# Patient Record
Sex: MALE | Race: WHITE
[De-identification: names, ages, dates, MRNs, and addresses within clinical notes are randomized per-mention and may not be internally consistent; named-entity substitution may affect disease eponyms.]

---

## 2022-04-16 ENCOUNTER — APPOINTMENT (OUTPATIENT)
Dept: ORTHOPEDIC SURGERY | Facility: CLINIC | Age: 72
End: 2022-04-16
Payer: MEDICARE

## 2022-04-16 ENCOUNTER — APPOINTMENT (OUTPATIENT)
Dept: ORTHOPEDIC SURGERY | Facility: CLINIC | Age: 72
End: 2022-04-16

## 2022-04-16 VITALS — WEIGHT: 195 LBS | BODY MASS INDEX: 29.55 KG/M2 | HEIGHT: 68 IN

## 2022-04-16 DIAGNOSIS — I10 ESSENTIAL (PRIMARY) HYPERTENSION: ICD-10-CM

## 2022-04-16 DIAGNOSIS — Z78.9 OTHER SPECIFIED HEALTH STATUS: ICD-10-CM

## 2022-04-16 DIAGNOSIS — Z87.438 PERSONAL HISTORY OF OTHER DISEASES OF MALE GENITAL ORGANS: ICD-10-CM

## 2022-04-16 DIAGNOSIS — E78.00 PURE HYPERCHOLESTEROLEMIA, UNSPECIFIED: ICD-10-CM

## 2022-04-16 PROBLEM — Z00.00 ENCOUNTER FOR PREVENTIVE HEALTH EXAMINATION: Status: ACTIVE | Noted: 2022-04-16

## 2022-04-16 PROCEDURE — J3490M: CUSTOM

## 2022-04-16 PROCEDURE — 99204 OFFICE O/P NEW MOD 45 MIN: CPT | Mod: 25

## 2022-04-16 PROCEDURE — 99214 OFFICE O/P EST MOD 30 MIN: CPT | Mod: 25

## 2022-04-16 PROCEDURE — 73010 X-RAY EXAM OF SHOULDER BLADE: CPT | Mod: RT

## 2022-04-16 PROCEDURE — 20611 DRAIN/INJ JOINT/BURSA W/US: CPT | Mod: LT

## 2022-04-16 PROCEDURE — 73030 X-RAY EXAM OF SHOULDER: CPT | Mod: RT

## 2022-04-16 RX ORDER — DICLOFENAC POTASSIUM 50 MG/1
50 TABLET, COATED ORAL TWICE DAILY
Qty: 60 | Refills: 0 | Status: ACTIVE | COMMUNITY
Start: 2022-04-16 | End: 1900-01-01

## 2022-04-16 NOTE — PHYSICAL EXAM
[de-identified] : R [Bilateral] : shoulder bilaterally [4 ___] : forward flexion 4[unfilled]/5 [4___] : external rotation 4[unfilled]/5 [] : motor and sensory intact distally [FreeTextEntry9] : FE: R 80, L 120\par ER: R 20, L 40 [FreeTextEntry1] : mild degenerative changes

## 2022-04-16 NOTE — ASSESSMENT
[FreeTextEntry1] : R/L Rc tendonitis/impingement.\par Trial of PT.\par Diclofenac sent.\par B/l Sa injections.\par MRIs if not improved.\par \par Large Joint Injection was performed because of pain and inflammation, failure of conservative treatment.  \par Medications:\par Depo-Medrol: 1 cc, 80 mg.\par Lidocaine: 2 cc, 1%. \par Marcaine: 2 cc, .25%. \par \par Medication was injected in the bilateral subacromial space. Patient has tried OTC's including aspirin, Ibuprofen, Aleve etc or prescription NSAIDs, and/or exercises at home and/ or physical therapy without satisfactory response. The risks, benefits, and alternatives to cortisone injection were explained in full to the patient. Risks outlined include but are not limited to infection, sepsis, bleeding, scarring, skin discoloration, temporary increase in pain, syncopal episode, failure to resolve symptoms, allergic reaction, symptom recurrence, and elevation of blood sugar in diabetics. Patient understood the risks. All questions were answered. After discussion of options, patient requested an injection. Oral informed consent was obtained and sterile prep of the injection site was performed using alcohol. Sterile technique was utilized for the procedure including the preparation of the solutions used for the injection. Ethyl chloride spray was used topically.  Sterile technique used. Patient tolerated procedure well. Post Procedure Instructions: Patient was advised to call if redness, pain, or fever occur and apply ice for 15 min. out of every hour for the next 12-24 hours as tolerated. patient was advised to rest the joint(s) for 2 days.\par \par Ultrasound Guidance was used for the following reasons: for prior failure or difficult injection and to visualize tearing and inflammation.\par Ultrasound guided injection was performed of the shoulder, visualization of the needle and placement of injection was performed without complication.\par \par

## 2022-04-16 NOTE — HISTORY OF PRESENT ILLNESS
[Left Arm] : left arm [Right Arm] : right arm [10] : 10 [Sharp] : sharp [Shooting] : shooting [Constant] : constant [Full time] : Work status: full time [de-identified] : 70 y/o RHD male  with bilateral shoulder pain that started 9 days ago without injury. Pain to right > left with difficulty lifting arm.  Pain is anterior and lateral.  No prior shoulder injuries. Pain to touch on right as well. No paresthesias. Pain is causing weakness.  He tried heat, ice and chiropractor.  \par  [] : Post Surgical Visit: no [FreeTextEntry3] : 1 week ago [FreeTextEntry5] : patient is here with shoulder pain since 1 week ago\par patient states he woke up with pain in the right shoulder that goes into the left  [de-identified] : none

## 2022-05-04 ENCOUNTER — APPOINTMENT (OUTPATIENT)
Dept: MRI IMAGING | Facility: CLINIC | Age: 72
End: 2022-05-04

## 2022-05-11 ENCOUNTER — APPOINTMENT (OUTPATIENT)
Dept: ORTHOPEDIC SURGERY | Facility: CLINIC | Age: 72
End: 2022-05-11

## 2022-05-12 ENCOUNTER — FORM ENCOUNTER (OUTPATIENT)
Age: 72
End: 2022-05-12

## 2022-05-13 ENCOUNTER — APPOINTMENT (OUTPATIENT)
Dept: MRI IMAGING | Facility: CLINIC | Age: 72
End: 2022-05-13
Payer: MEDICARE

## 2022-05-13 PROCEDURE — 73221 MRI JOINT UPR EXTREM W/O DYE: CPT | Mod: RT

## 2022-05-18 ENCOUNTER — APPOINTMENT (OUTPATIENT)
Dept: ORTHOPEDIC SURGERY | Facility: CLINIC | Age: 72
End: 2022-05-18
Payer: MEDICARE

## 2022-05-18 VITALS — WEIGHT: 195 LBS | HEIGHT: 68 IN | BODY MASS INDEX: 29.55 KG/M2

## 2022-05-18 DIAGNOSIS — M25.512 PAIN IN RIGHT SHOULDER: ICD-10-CM

## 2022-05-18 DIAGNOSIS — M67.921 UNSPECIFIED DISORDER OF SYNOVIUM AND TENDON, RIGHT UPPER ARM: ICD-10-CM

## 2022-05-18 DIAGNOSIS — M25.511 PAIN IN RIGHT SHOULDER: ICD-10-CM

## 2022-05-18 PROCEDURE — 99215 OFFICE O/P EST HI 40 MIN: CPT

## 2022-05-18 RX ORDER — METHYLPREDNISOLONE 4 MG/1
4 TABLET ORAL
Qty: 1 | Refills: 0 | Status: ACTIVE | COMMUNITY
Start: 2022-05-18 | End: 1900-01-01

## 2022-05-18 RX ORDER — CYCLOBENZAPRINE HYDROCHLORIDE 10 MG/1
10 TABLET, FILM COATED ORAL
Qty: 30 | Refills: 2 | Status: ACTIVE | COMMUNITY
Start: 2022-05-18 | End: 1900-01-01

## 2022-05-18 NOTE — PHYSICAL EXAM
[Bilateral] : shoulder bilaterally [4 ___] : forward flexion 4[unfilled]/5 [4___] : external rotation 4[unfilled]/5 [] : no AC joint tenderness [FreeTextEntry9] : FE: R 80, L 120\par ER: R 20, L 40 [FreeTextEntry1] : mild degenerative changes

## 2022-05-18 NOTE — HISTORY OF PRESENT ILLNESS
[Left Arm] : left arm [Right Arm] : right arm [Gradual] : gradual [10] : 10 [Dull/Aching] : dull/aching [Radiating] : radiating [Sharp] : sharp [Constant] : constant [Household chores] : household chores [Leisure] : leisure [Sleep] : sleep [Nothing helps with pain getting better] : Nothing helps with pain getting better [Shooting] : shooting [Full time] : Work status: full time [de-identified] : 5/18/22: Here for follow up bilateral shoulder pain and MRI review. He was able to tolerate 20 minutes for R shoulder imaging. He continues to have sharp and shooting pain. He cannot use arms to get out of chair. Feels he does not have strength to push himself up. He does not sleep more than 10-15 minutes with pain. Radiating pain, n/t bilateral arms to thenar area of hands. Partial relief with csi x 2 weeks. He feels diclofenac is helpful. His wife has to help him with adl's, needs full assistance to get dressed.\par \par MRI R shoulder: \par Impression: Motion artifact.\par 1. Circumferential labral fraying, degeneration, and tear. Biceps tendinopathy with tear of the horizontal \par segment and anchor and tenosynovitis.\par 2. 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 Infraspinatus tendinopathy and fraying with 5 mm articular insertional tear and traction cysts in the humeral \par head with stress reaction and no fracture.\par 4. Supraspinatus tendinopathy and fraying with 5-mm articular tear.\par 5. Capsular thickening, which can be seen with adhesive capsulitis.\par 6. Glenohumeral arthrosis with joint effusion.\par \par 4/16/22: 72 y/o RHD male  with bilateral shoulder pain that started 9 days ago without injury. Pain to right > left with difficulty lifting arm.  Pain is anterior and lateral.  No prior shoulder injuries. Pain to touch on right as well. No paresthesias. Pain is causing weakness.  He tried heat, ice and chiropractor.  \par  [] : Post Surgical Visit: no [FreeTextEntry1] : Kun shoulders [FreeTextEntry3] : 1 week ago [FreeTextEntry5] : patient is here with shoulder pain since 1 week ago\par patient states he woke up with pain in the right shoulder that goes into the left  [FreeTextEntry7] : Kun arms [de-identified] : Pain is always at a 10 [de-identified] :  [de-identified] : XR [de-identified] : none

## 2022-05-18 NOTE — ASSESSMENT
[FreeTextEntry1] : R/L RC tendonitis/impingement. Cervical radiculitis.\par MRI R shoulder reveal PRCT, bursitis, biceps tearing\par B/L SA injections 4/16/22 partial relief x 2 weeks.\par He will try PT. \par MDP, Cyclobenzaprine qhs.\par May be related to his C-spine. Recommend seeing pain mgmt for possible BLAZE's.\par Return 6 weeks re-eval.\par \par

## 2022-05-18 NOTE — DATA REVIEWED
[MRI] : MRI [Right] : of the right [Shoulder] : shoulder [I independently reviewed and interpreted images and report] : I independently reviewed and interpreted images and report [FreeTextEntry1] : PRCT, bursitis, biceps tearing

## 2022-05-31 ENCOUNTER — APPOINTMENT (OUTPATIENT)
Dept: PAIN MANAGEMENT | Facility: CLINIC | Age: 72
End: 2022-05-31
Payer: MEDICARE

## 2022-05-31 VITALS — BODY MASS INDEX: 29.55 KG/M2 | HEIGHT: 68 IN | WEIGHT: 195 LBS

## 2022-05-31 PROCEDURE — 99214 OFFICE O/P EST MOD 30 MIN: CPT

## 2022-05-31 RX ORDER — DICLOFENAC SODIUM 50 MG/1
50 TABLET, DELAYED RELEASE ORAL TWICE DAILY
Qty: 60 | Refills: 0 | Status: ACTIVE | COMMUNITY
Start: 2022-05-31 | End: 1900-01-01

## 2022-05-31 NOTE — PHYSICAL EXAM
[de-identified] : Constitutional; Appears well, no apparent distress\par Ability to communicate: Normal \par Respiratory: non-labored breathing\par Skin: No rash noted\par Head: Normocephalic, atraumatic\par Neck: no visible thyroid enlargement\par Eyes: Extraocular movements intact\par Neurologic: Alert and oriented x3\par Psychiatric: normal mood, affect and behavior \par \par  [] : positive [place]

## 2022-05-31 NOTE — HISTORY OF PRESENT ILLNESS
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

## 2022-05-31 NOTE — DISCUSSION/SUMMARY
[de-identified] : After discussing various treatment options with the patient including but not limited to oral medications, physical therapy, exercise modalities as well as interventional spinal injections, we have decided with the following plan:\par \par - Continue Home exercises, stretching, activity modification, physical therapy, and conservative care.\par - Recommend C7-T1 Cervical Epidural Steroid Injection under fluoroscopic guidance with image.\par - The risks, benefits and alternatives of the proposed procedure were explained in detail with the patient. The risks outlined include but are not limited to infection, bleeding, post-dural puncture headache, nerve injury, a temporary increase in pain, failure to resolve symptoms, allergic reaction, symptom recurrence, and possible elevation of blood sugar in diabetics. All questions were answered to patient's apparent satisfaction and he/she verbalized an understanding.\par - Patient is presenting with acute/sub-acute radicular pain with impairment in ADLs and functionality.  The pain has not responded to conservative care including NSAID therapy and/or physical therapy.  There is no bleeding tendency, unstable medical condition, or systemic infection.\par - Follow up in 1-2 weeks post injection for re-evaluation.\par - Recommend Diclofenac 50mg BID PRN. Potential adverse effects including but not limited to bleeding, ulcers, increased risk of hypertension, heart disease, kidney disease and stroke were discussed with the patient.  Medication would allow patient to be more mobile and perform ADL's.  Will continue to monitor patient and attempt to wean as soon as possible. Will use the lowest dosage possible for the shortest possible period of time.\par

## 2022-06-21 ENCOUNTER — APPOINTMENT (OUTPATIENT)
Dept: ORTHOPEDIC SURGERY | Facility: CLINIC | Age: 72
End: 2022-06-21
Payer: MEDICARE

## 2022-06-21 VITALS — WEIGHT: 195 LBS | BODY MASS INDEX: 29.55 KG/M2 | HEIGHT: 68 IN

## 2022-06-21 PROCEDURE — 99214 OFFICE O/P EST MOD 30 MIN: CPT

## 2022-06-21 RX ORDER — CYCLOBENZAPRINE HYDROCHLORIDE 10 MG/1
10 TABLET, FILM COATED ORAL
Qty: 30 | Refills: 2 | Status: ACTIVE | COMMUNITY
Start: 2022-06-21 | End: 1900-01-01

## 2022-06-21 NOTE — ASSESSMENT
[FreeTextEntry1] : R/L RC tendonitis/impingement. Cervical radiculitis.\par MRI R shoulder reveal PRCT, bursitis, biceps tearing\par B/L SA injections 4/16/22 partial relief x 2 weeks.\par He continue PT.  \par Diclofenac, Flexeril 10 mg QHS.\par He saw pain mgmt for possible BLAZE's.\par Return 6 weeks re-eval.\par \par

## 2022-06-21 NOTE — HISTORY OF PRESENT ILLNESS
[Left Arm] : left arm [Right Arm] : right arm [Gradual] : gradual [10] : 10 [Dull/Aching] : dull/aching [Radiating] : radiating [Sharp] : sharp [Shooting] : shooting [Constant] : constant [Nothing helps with pain getting better] : Nothing helps with pain getting better [Full time] : Work status: full time [de-identified] : 6/21/22: Here for follow up.  He saw pain management who did recommend a BLAZE. \par \par 5/18/22: Here for follow up bilateral shoulder pain and MRI review. He was able to tolerate 20 minutes for R shoulder imaging. He continues to have sharp and shooting pain. He cannot use arms to get out of chair. Feels he does not have strength to push himself up. He does not sleep more than 10-15 minutes with pain. Radiating pain, n/t bilateral arms to thenar area of hands. Partial relief with csi x 2 weeks. He feels diclofenac is helpful. His wife has to help him with adl's, needs full assistance to get dressed.\par \par MRI R shoulder: \par Impression: Motion artifact.\par 1. Circumferential labral fraying, degeneration, and tear. Biceps tendinopathy with tear of the horizontal \par segment and anchor and tenosynovitis.\par 2. 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 Infraspinatus tendinopathy and fraying with 5 mm articular insertional tear and traction cysts in the humeral \par head with stress reaction and no fracture.\par 4. Supraspinatus tendinopathy and fraying with 5-mm articular tear.\par 5. Capsular thickening, which can be seen with adhesive capsulitis.\par 6. Glenohumeral arthrosis with joint effusion.\par \par 4/16/22: 72 y/o RHD male  with bilateral shoulder pain that started 9 days ago without injury. Pain to right > left with difficulty lifting arm.  Pain is anterior and lateral.  No prior shoulder injuries. Pain to touch on right as well. No paresthesias. Pain is causing weakness.  He tried heat, ice and chiropractor.  \par  [] : Post Surgical Visit: no [FreeTextEntry1] : Kun shoulders [FreeTextEntry5] : Pain has not improved [FreeTextEntry7] : Kun arms [de-identified] : = [de-identified] : physical therapy, cyclobenzaprine

## 2022-07-13 ENCOUNTER — APPOINTMENT (OUTPATIENT)
Age: 72
End: 2022-07-13

## 2022-07-13 PROCEDURE — 62321 NJX INTERLAMINAR CRV/THRC: CPT

## 2022-07-22 ENCOUNTER — RX RENEWAL (OUTPATIENT)
Age: 72
End: 2022-07-22

## 2022-07-26 ENCOUNTER — APPOINTMENT (OUTPATIENT)
Dept: PAIN MANAGEMENT | Facility: CLINIC | Age: 72
End: 2022-07-26

## 2022-07-26 VITALS — BODY MASS INDEX: 30.01 KG/M2 | WEIGHT: 198 LBS | HEIGHT: 68 IN

## 2022-07-26 PROCEDURE — 99214 OFFICE O/P EST MOD 30 MIN: CPT

## 2022-07-26 NOTE — PHYSICAL EXAM
[de-identified] : Constitutional; Appears well, no apparent distress\par Ability to communicate: Normal \par Respiratory: non-labored breathing\par Skin: No rash noted\par Head: Normocephalic, atraumatic\par Neck: no visible thyroid enlargement\par Eyes: Extraocular movements intact\par Neurologic: Alert and oriented x3\par Psychiatric: normal mood, affect and behavior \par \par  [] : no trapezial tenderness

## 2022-07-26 NOTE — HISTORY OF PRESENT ILLNESS
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

## 2022-08-18 ENCOUNTER — FORM ENCOUNTER (OUTPATIENT)
Age: 72
End: 2022-08-18

## 2022-08-18 ENCOUNTER — APPOINTMENT (OUTPATIENT)
Dept: ORTHOPEDIC SURGERY | Facility: CLINIC | Age: 72
End: 2022-08-18

## 2022-08-18 VITALS — HEIGHT: 68 IN | WEIGHT: 198 LBS | BODY MASS INDEX: 30.01 KG/M2

## 2022-08-18 DIAGNOSIS — M75.41 IMPINGEMENT SYNDROME OF RIGHT SHOULDER: ICD-10-CM

## 2022-08-18 DIAGNOSIS — M75.42 IMPINGEMENT SYNDROME OF LEFT SHOULDER: ICD-10-CM

## 2022-08-18 PROCEDURE — 99213 OFFICE O/P EST LOW 20 MIN: CPT

## 2022-08-18 RX ORDER — DIAZEPAM 5 MG/1
5 TABLET ORAL
Qty: 1 | Refills: 0 | Status: ACTIVE | COMMUNITY
Start: 2022-08-18 | End: 1900-01-01

## 2022-08-18 NOTE — ASSESSMENT
[FreeTextEntry1] : R/L RC tendonitis/impingement. Cervical radiculitis.\par MRI R shoulder reveal PRCT, bursitis, biceps tearing\par B/L SA injections 4/16/22 partial relief x 2 weeks.\par He continue PT.  \par Diclofenac, Flexeril 10 mg QHS.\par He sees Dr. Randall for BLAZE.\par MRI L shoulder.  Valium sent. \par RTO for review.\par \par \par

## 2022-08-18 NOTE — HISTORY OF PRESENT ILLNESS
[Left Arm] : left arm [Right Arm] : right arm [Gradual] : gradual [10] : 10 [Dull/Aching] : dull/aching [Radiating] : radiating [Sharp] : sharp [Shooting] : shooting [Constant] : constant [Nothing helps with pain getting better] : Nothing helps with pain getting better [Full time] : Work status: full time [de-identified] : 8/18/22: Here for follow up.  He had a BLAZE with improvement.  He is scheduled for a second BLAZE.  He feels some 'popping' on the left shoulder.  \par \par 6/21/22: Here for follow up.  He saw pain management who did recommend a BLAZE. \par \par 5/18/22: Here for follow up bilateral shoulder pain and MRI review. He was able to tolerate 20 minutes for R shoulder imaging. He continues to have sharp and shooting pain. He cannot use arms to get out of chair. Feels he does not have strength to push himself up. He does not sleep more than 10-15 minutes with pain. Radiating pain, n/t bilateral arms to thenar area of hands. Partial relief with csi x 2 weeks. He feels diclofenac is helpful. His wife has to help him with adl's, needs full assistance to get dressed.\par \par MRI R shoulder: \par Impression: Motion artifact.\par 1. Circumferential labral fraying, degeneration, and tear. Biceps tendinopathy with tear of the horizontal \par segment and anchor and tenosynovitis.\par 2. 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 Infraspinatus tendinopathy and fraying with 5 mm articular insertional tear and traction cysts in the humeral \par head with stress reaction and no fracture.\par 4. Supraspinatus tendinopathy and fraying with 5-mm articular tear.\par 5. Capsular thickening, which can be seen with adhesive capsulitis.\par 6. Glenohumeral arthrosis with joint effusion.\par \par 4/16/22: 72 y/o RHD male  with bilateral shoulder pain that started 9 days ago without injury. Pain to right > left with difficulty lifting arm.  Pain is anterior and lateral.  No prior shoulder injuries. Pain to touch on right as well. No paresthesias. Pain is causing weakness.  He tried heat, ice and chiropractor.  \par  [] : Post Surgical Visit: no [FreeTextEntry1] : Kun shoulders [FreeTextEntry5] : Pain has not improved [FreeTextEntry7] : Kun arms [de-identified] : = [de-identified] : physical therapy, cyclobenzaprine

## 2022-08-19 ENCOUNTER — APPOINTMENT (OUTPATIENT)
Dept: MRI IMAGING | Facility: CLINIC | Age: 72
End: 2022-08-19

## 2022-08-19 PROCEDURE — 73221 MRI JOINT UPR EXTREM W/O DYE: CPT | Mod: LT

## 2022-08-29 ENCOUNTER — APPOINTMENT (OUTPATIENT)
Dept: PAIN MANAGEMENT | Facility: CLINIC | Age: 72
End: 2022-08-29

## 2022-08-29 PROCEDURE — 62321 NJX INTERLAMINAR CRV/THRC: CPT

## 2022-08-29 NOTE — PROCEDURE
[FreeTextEntry3] : Date of Service: 08/29/2022 \par \par Account: [id number]\par \par Patient: BRIELLE LUCIANO \par \par YOB: 1950\par \par Age: 72 year\par \par \par Surgeon:                                                         Chito Randall D.O.\par \par Pre-Operative Diagnosis:                            Cervical Radiculopathy (M54.12)\par \par Post-Operative Diagnosis:                          Cervical Radiculopathy (M54.12)\par \par Procedure:                                                     Interlaminar cervical epidural steroid injection (C7-T1) under fluoroscopic guidance\par \par Anesthesia:                                                    Local with MAC\par \par \par This procedure was carried out using fluoroscopic guidance.  The risks and benefits of the procedure were discussed extensively with the patient.  The consent of the patient was obtained and the following procedure was performed.\par \par The patient was placed in the prone position using thoracic and chin supports.  The cervical area was prepped and draped in a sterile fashion. A timeout was performed with all essential staff present and the site and side were verified. The fluoroscope visualized the C7-T1 interspace using slight cephalad-caudad angulation and this area was marked.  Using sterile technique, the superficial skin was anesthetized with 1% Lidocaine without epinephrine.  An 18-gauge Tuohy needle was advanced under fluoroscopy using [id number] technique until ligament was engaged.  The stylette was then removed and a column of preservative free normal saline flushed through the Tuohy needle and left with a concave fluid level above the butterfly portion of the Tuohy needle.  The needle was then advanced under fluoroscopic guidance until the column of saline disappeared.  Lateral view confirmed final needle tip placement in the epidural space.  After negative aspiration for heme and CSF, 1 cc of Omnipaque contrast injected under live fluoroscopy, confirmed good cervical epidurogram.  \par \par Cervical epidurogram showed no evidence of intrathecal or intravascular flow, and good bilateral epidural flow from C3 to T2 levels.  An injectate of 3 cc of preservative free normal saline plus 12 mg of betamethasone was then injected into the epidural space. The needle was subsequently removed and pressure was applied.\par \par Anesthesia personnel were present throughout the procedure.  The patient tolerated the procedure well and was instructed to contact me immediately if there were any problems.\par \par Chito Randall D.O.\par

## 2022-08-30 ENCOUNTER — APPOINTMENT (OUTPATIENT)
Dept: ORTHOPEDIC SURGERY | Facility: CLINIC | Age: 72
End: 2022-08-30

## 2022-08-30 VITALS — WEIGHT: 198 LBS | BODY MASS INDEX: 30.01 KG/M2 | HEIGHT: 68 IN

## 2022-08-30 DIAGNOSIS — M75.122 COMPLETE ROTATOR CUFF TEAR OR RUPTURE OF LEFT SHOULDER, NOT SPECIFIED AS TRAUMATIC: ICD-10-CM

## 2022-08-30 DIAGNOSIS — M75.22 BICIPITAL TENDINITIS, LEFT SHOULDER: ICD-10-CM

## 2022-08-30 PROCEDURE — 99215 OFFICE O/P EST HI 40 MIN: CPT

## 2022-08-30 NOTE — DATA REVIEWED
[MRI] : MRI [Left] : left [Shoulder] : shoulder [I independently reviewed and interpreted images and report] : I independently reviewed and interpreted images and report [FreeTextEntry1] : full thickness supra tear, biceps tearing

## 2022-08-30 NOTE — ASSESSMENT
[FreeTextEntry1] : R/L RC tendonitis/impingement. Cervical radiculitis.\par MRI R shoulder reveal PRCT, bursitis, biceps tearing\par B/L SA injections 4/16/22 partial relief x 2 weeks.\par Diclofenac, Flexeril 10 mg QHS.\par He sees Dr. Randall for BLAZE.\par MRI L shoulder reviewed: full thickness supra tear, biceps tearing.\par Discussed rehab and recovery after SA, SAD, acromioplasty, synovectomy, GHD, RCR, biceps tenotomy.\par He will think about it. \par \par Risks:\par The advantages, disadvantages, complications and alternatives of continued non-operative treatment versus operative treatment were discussed with the patient.   We specifically discussed the risks of bleeding, infection, damage to neurovascular structures, failure of wound healing, wound dehiscence, scaring, failure of repair, need for revision surgery, shoulder pain, shoulder stiffness, shoulder arthritis and complications of surgery and anesthesia in general including deep venous thrombosis, pulmonary embolism, myocardial infarction, stroke, loss of limb and death.  The patient understood and agreed to proceed.\par \par \par \par \par

## 2022-08-30 NOTE — HISTORY OF PRESENT ILLNESS
[Left Arm] : left arm [Right Arm] : right arm [Gradual] : gradual [10] : 10 [Dull/Aching] : dull/aching [Radiating] : radiating [Sharp] : sharp [Shooting] : shooting [Constant] : constant [Nothing helps with pain getting better] : Nothing helps with pain getting better [Full time] : Work status: full time [de-identified] : 8/30/22:  Here for MRI review.\par \par MRI L shoulder:\par 1. AC joint arthrosis, narrowing of the supraspinatus outlet which can be seen with impingement.\par 2. 15 x 17 mm full-thickness insertional tear supraspinatus with proximal tendinopathy and no muscle atrophy.\par 3. Capsular thickening which can be seen with adhesive capsulitis.\par 4. Tear of the superior labrum and posterior inferior labrum. Biceps tendinopathy with interstitial tear extending  through the anchor with tenosynovitis.\par 5. Subscapularis tendinopathy with insertional fraying, ill-defined tear within the fraying, and 2 and 3 mm  subchondral traction cysts in the humeral head without fracture.\par \par 8/18/22: Here for follow up.  He had a BLAZE with improvement.  He is scheduled for a second BLAZE.  He feels some 'popping' on the left shoulder.  \par \par 6/21/22: Here for follow up.  He saw pain management who did recommend a BLAZE. \par \par 5/18/22: Here for follow up bilateral shoulder pain and MRI review. He was able to tolerate 20 minutes for R shoulder imaging. He continues to have sharp and shooting pain. He cannot use arms to get out of chair. Feels he does not have strength to push himself up. He does not sleep more than 10-15 minutes with pain. Radiating pain, n/t bilateral arms to thenar area of hands. Partial relief with csi x 2 weeks. He feels diclofenac is helpful. His wife has to help him with adl's, needs full assistance to get dressed.\par \par MRI R shoulder: \par Impression: Motion artifact.\par 1. Circumferential labral fraying, degeneration, and tear. Biceps tendinopathy with tear of the horizontal  segment and anchor and tenosynovitis.\par 2. 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 Infraspinatus tendinopathy and fraying with 5 mm articular insertional tear and traction cysts in the humeral head with stress reaction and no fracture.\par 4. Supraspinatus tendinopathy and fraying with 5-mm articular tear.\par 5. Capsular thickening, which can be seen with adhesive capsulitis.\par 6. Glenohumeral arthrosis with joint effusion.\par \par 4/16/22: 72 y/o RHD male  with bilateral shoulder pain that started 9 days ago without injury. Pain to right > left with difficulty lifting arm.  Pain is anterior and lateral.  No prior shoulder injuries. Pain to touch on right as well. No paresthesias. Pain is causing weakness.  He tried heat, ice and chiropractor.  \par  [] : Post Surgical Visit: no [FreeTextEntry1] : Kun shoulders [FreeTextEntry5] : Pain has not improved [FreeTextEntry7] : Kun arms [de-identified] : = [de-identified] : physical therapy, cyclobenzaprine

## 2022-09-13 ENCOUNTER — APPOINTMENT (OUTPATIENT)
Dept: PAIN MANAGEMENT | Facility: CLINIC | Age: 72
End: 2022-09-13

## 2022-09-13 VITALS — HEIGHT: 68 IN | BODY MASS INDEX: 30.01 KG/M2 | WEIGHT: 198 LBS

## 2022-09-13 PROCEDURE — 99214 OFFICE O/P EST MOD 30 MIN: CPT

## 2022-09-13 NOTE — DISCUSSION/SUMMARY
[de-identified] : After discussing various treatment options with the patient including but not limited to oral medications, physical therapy, exercise, as well as interventional spinal injections, we have decided with the following plan:\par \par - Continue home exercises, stretching, activity modification, physical therapy, and conservative care.\par - Will order lumbar MRI to rule out HNP. Please let this note serve as a formal request for authorization to perform a MRI of their Lumbar Spine.\par - Follow-up post diagnostic imaging to review and discuss further treatment.\par

## 2022-09-13 NOTE — PHYSICAL EXAM
[de-identified] : Constitutional; Appears well, no apparent distress\par Ability to communicate: Normal \par Respiratory: non-labored breathing\par Skin: No rash noted\par Head: Normocephalic, atraumatic\par Neck: no visible thyroid enlargement\par Eyes: Extraocular movements intact\par Neurologic: Alert and oriented x3\par Psychiatric: normal mood, affect and behavior \par \par  [Flexion] : flexion [] : non-antalgic

## 2022-09-16 ENCOUNTER — FORM ENCOUNTER (OUTPATIENT)
Age: 72
End: 2022-09-16

## 2022-09-17 ENCOUNTER — APPOINTMENT (OUTPATIENT)
Dept: MRI IMAGING | Facility: CLINIC | Age: 72
End: 2022-09-17

## 2022-09-17 PROCEDURE — 72148 MRI LUMBAR SPINE W/O DYE: CPT

## 2022-09-29 ENCOUNTER — APPOINTMENT (OUTPATIENT)
Dept: PAIN MANAGEMENT | Facility: CLINIC | Age: 72
End: 2022-09-29

## 2022-09-29 VITALS — WEIGHT: 198 LBS | HEIGHT: 68 IN | BODY MASS INDEX: 30.01 KG/M2

## 2022-09-29 PROCEDURE — 99214 OFFICE O/P EST MOD 30 MIN: CPT

## 2022-09-29 RX ORDER — DICLOFENAC SODIUM 75 MG/1
75 TABLET, DELAYED RELEASE ORAL
Qty: 60 | Refills: 0 | Status: ACTIVE | COMMUNITY
Start: 2022-06-21 | End: 1900-01-01

## 2022-09-29 NOTE — PHYSICAL EXAM
[Flexion] : flexion [de-identified] : Constitutional; Appears well, no apparent distress\par Ability to communicate: Normal \par Respiratory: non-labored breathing\par Skin: No rash noted\par Head: Normocephalic, atraumatic\par Neck: no visible thyroid enlargement\par Eyes: Extraocular movements intact\par Neurologic: Alert and oriented x3\par Psychiatric: normal mood, affect and behavior \par \par  [] : non-antalgic

## 2022-09-29 NOTE — HISTORY OF PRESENT ILLNESS
[Neck] : neck [Lower back] : lower back [3] : 3 [1] : 2 [Dull/Aching] : dull/aching [Radiating] : radiating [Tingling] : tingling [Squeezing] : squeezing [Constant] : constant [Sleep] : sleep [Meds] : meds [Injection therapy] : injection therapy [] : no [FreeTextEntry1] : b/l neck and back  [FreeTextEntry6] : weakness, numbness, clicking  [FreeTextEntry7] : b/l shoulders, arms to the fingers, b/l legs to the foot  [FreeTextEntry9] : stretching, diclofenac  [de-identified] : lifting, using arms  [de-identified] : C MRI

## 2022-09-29 NOTE — DISCUSSION/SUMMARY
[de-identified] : After discussing various treatment options with the patient including but not limited to oral medications, physical therapy, exercise modalities as well as interventional spinal injections, we have decided with the following plan:\par \par - Continue Home exercises, stretching, activity modification, physical therapy, and conservative care.\par - MRI report and/or images was reviewed and discussed with the patient.\par - Recommend L4-5 Lumbar Epidural Steroid Injection under fluoroscopic guidance with image.\par - The risks, benefits and alternatives of the proposed procedure were explained in detail with the patient. The risks outlined include but are not limited to infection, bleeding, post-dural puncture headache, nerve injury, a temporary increase in pain, failure to resolve symptoms, allergic reaction, symptom recurrence, and possible elevation of blood sugar in diabetics. All questions were answered to patient's apparent satisfaction and he/she verbalized an understanding.\par - Patient is presenting with acute/sub-acute radicular pain with impairment in ADLs and functionality.  The pain has not responded to conservative care including NSAID therapy and/or physical therapy.  There is no bleeding tendency, unstable medical condition, or systemic infection.\par - Follow up in 1-2 weeks post injection for re-evaluation.\par - Recommend continue Diclofenac 75mg BID PRN. Potential adverse effects including but not limited to bleeding, ulcers, increased risk of hypertension, heart disease, kidney disease and stroke were discussed with the patient.  Medication would allow patient to be more mobile and perform ADL's.  Will continue to monitor patient and attempt to wean as soon as possible. Will use the lowest dosage possible for the shortest possible period of time.\par

## 2022-10-25 ENCOUNTER — APPOINTMENT (OUTPATIENT)
Dept: PAIN MANAGEMENT | Facility: CLINIC | Age: 72
End: 2022-10-25
Payer: MEDICARE

## 2022-10-25 PROCEDURE — 62323 NJX INTERLAMINAR LMBR/SAC: CPT | Mod: 1L

## 2022-10-25 NOTE — PROCEDURE
[FreeTextEntry3] : Date of Service: 10/25/2022 \par \par Account: [id number]\par \par Patient: BRIELLE LUCIANO \par \par YOB: 1950\par \par Age: 72 year\par \par \par Surgeon:                                                         Chito Randall D.O.\par \par Pre-Operative Diagnosis:                             Lumbosacral radiculitis\par \par Post-Operative Diagnosis:                           Same\par \par Procedure:                                                      Interlaminar lumbar epidural steroid injection (L4-5) under fluoroscopic guidance\par \par Anesthesia:                                                     Local with MAC\par \par \par This procedure was carried out using fluoroscopic guidance.  The risks and benefits of the procedure were discussed extensively with the patient.  The consent of the patient was obtained and the following procedure was performed.\par \par The patient was placed in the prone position.  The lumbar area was prepped and draped in a sterile fashion.  A timeout was performed with all essential staff present and the site and side were verified. Under AP view with slight cephalad-caudad angulation, the L4-5 interspace was identified and marked.  Using sterile technique, the superficial skin was anesthetized with 1% Lidocaine without epinephrine.  A 20-gauge Tuohy needle was advanced into the epidural space under fluoroscopy using [id number] technique and using loss of resistance at the L4-5 level.  After negative aspiration for heme or CSF, an epidurogram was obtained using 2-3 cc Omnipaque contrast injected under live fluoroscopy, confirming epidural placement of the needle.  \par \par Epidurogram showed no evidence of intrathecal or intravascular flow, and good evidence of bilateral epidural flow from L3-S2 levels.  After this, 4 cc of preservative free normal saline plus 12 mg of betamethasone were injected into the epidural space.\par \par The needle was subsequently removed.  Anesthesia personnel were present throughout the procedure.\par \par The patient tolerated the procedure well and was instructed to contact me immediately if there were any problems.\par \par Chito Randall D.O.\par

## 2022-11-08 ENCOUNTER — APPOINTMENT (OUTPATIENT)
Dept: PAIN MANAGEMENT | Facility: CLINIC | Age: 72
End: 2022-11-08

## 2022-11-08 VITALS — BODY MASS INDEX: 29.55 KG/M2 | HEIGHT: 68 IN | WEIGHT: 195 LBS

## 2022-11-08 DIAGNOSIS — M54.2 CERVICALGIA: ICD-10-CM

## 2022-11-08 DIAGNOSIS — M54.50 LOW BACK PAIN, UNSPECIFIED: ICD-10-CM

## 2022-11-08 DIAGNOSIS — M54.12 RADICULOPATHY, CERVICAL REGION: ICD-10-CM

## 2022-11-08 DIAGNOSIS — M54.17 RADICULOPATHY, LUMBOSACRAL REGION: ICD-10-CM

## 2022-11-08 PROCEDURE — 99214 OFFICE O/P EST MOD 30 MIN: CPT

## 2022-11-08 NOTE — PHYSICAL EXAM
[Flexion] : flexion [de-identified] : Constitutional; Appears well, no apparent distress\par Ability to communicate: Normal \par Respiratory: non-labored breathing\par Skin: No rash noted\par Head: Normocephalic, atraumatic\par Neck: no visible thyroid enlargement\par Eyes: Extraocular movements intact\par Neurologic: Alert and oriented x3\par Psychiatric: normal mood, affect and behavior \par \par  [] : non-antalgic

## 2022-11-08 NOTE — DISCUSSION/SUMMARY
[de-identified] : After discussing various treatment options with the patient including but not limited to oral medications, physical therapy, exercise modalities as well as interventional spinal injections, we have decided with the following plan:\par \par - Continue Home exercises, stretching, activity modification, physical therapy, and conservative care.\par - MRI report and/or images was reviewed and discussed with the patient.\par - Recommend L4-5 Lumbar Epidural Steroid Injection under fluoroscopic guidance with image.\par - The risks, benefits and alternatives of the proposed procedure were explained in detail with the patient. The risks outlined include but are not limited to infection, bleeding, post-dural puncture headache, nerve injury, a temporary increase in pain, failure to resolve symptoms, allergic reaction, symptom recurrence, and possible elevation of blood sugar in diabetics. All questions were answered to patient's apparent satisfaction and he/she verbalized an understanding.\par - Patient is presenting with acute/sub-acute radicular pain with impairment in ADLs and functionality.  The pain has not responded to conservative care including NSAID therapy and/or physical therapy.  There is no bleeding tendency, unstable medical condition, or systemic infection.\par - Follow up in 1-2 weeks post injection for re-evaluation.\par - Will call to schedule.\par  \par - Recommend continue Diclofenac 75mg BID PRN. Potential adverse effects including but not limited to bleeding, ulcers, increased risk of hypertension, heart disease, kidney disease and stroke were discussed with the patient.  Medication would allow patient to be more mobile and perform ADL's.  Will continue to monitor patient and attempt to wean as soon as possible. Will use the lowest dosage possible for the shortest possible period of time.\par - Recommend Cyclobenzaprine 10mg BID PRN for muscle spasms and to assist with pain relief.\par \par

## 2022-11-08 NOTE — HISTORY OF PRESENT ILLNESS
[Neck] : neck [Lower back] : lower back [Dull/Aching] : dull/aching [Radiating] : radiating [Tingling] : tingling [Squeezing] : squeezing [Sleep] : sleep [Meds] : meds [Injection therapy] : injection therapy [3] : 3 [0] : 0 [Occasional] : occasional [] : no [FreeTextEntry1] : b/l neck and back  [FreeTextEntry6] : weakness, numbness, clicking  [FreeTextEntry7] : b/l shoulders, arms to the fingers, b/l legs to the foot  [FreeTextEntry9] : stretching, diclofenac  [de-identified] : lifting, using arms  [de-identified] : C MRI  [TWNoteComboBox1] : 90%

## 2025-01-29 ENCOUNTER — APPOINTMENT (OUTPATIENT)
Dept: ORTHOPEDIC SURGERY | Facility: CLINIC | Age: 75
End: 2025-01-29
Payer: MEDICARE

## 2025-01-29 VITALS — HEIGHT: 68 IN | WEIGHT: 195 LBS | BODY MASS INDEX: 29.55 KG/M2

## 2025-01-29 DIAGNOSIS — M72.0 PALMAR FASCIAL FIBROMATOSIS [DUPUYTREN]: ICD-10-CM

## 2025-01-29 PROCEDURE — 99203 OFFICE O/P NEW LOW 30 MIN: CPT
